# Patient Record
Sex: FEMALE | Race: BLACK OR AFRICAN AMERICAN | NOT HISPANIC OR LATINO | Employment: FULL TIME | URBAN - METROPOLITAN AREA
[De-identification: names, ages, dates, MRNs, and addresses within clinical notes are randomized per-mention and may not be internally consistent; named-entity substitution may affect disease eponyms.]

---

## 2023-06-15 ENCOUNTER — THERAPY VISIT (OUTPATIENT)
Dept: PHYSICAL THERAPY | Facility: CLINIC | Age: 43
End: 2023-06-15
Payer: COMMERCIAL

## 2023-06-15 DIAGNOSIS — R10.2 PELVIC PAIN IN FEMALE: Primary | ICD-10-CM

## 2023-06-15 PROCEDURE — 97161 PT EVAL LOW COMPLEX 20 MIN: CPT | Mod: GP | Performed by: PHYSICAL THERAPIST

## 2023-06-15 PROCEDURE — 97110 THERAPEUTIC EXERCISES: CPT | Mod: GP | Performed by: PHYSICAL THERAPIST

## 2023-06-15 NOTE — PROGRESS NOTES
PHYSICAL THERAPY EVALUATION  Type of Visit: Evaluation    See electronic medical record for Abuse and Falls Screening details.    Subjective      Presenting condition or subjective complaint: Pelvic Pain  Date of onset: 06/15/22    Relevant medical history: -- (fibromyalgia)   Dates & types of surgery: none    Prior diagnostic imaging/testing results:       Prior therapy history for the same diagnosis, illness or injury: Yes [id number] however at that time was also having urgency issues with urination, not having that problem anymore      Living Environment  Social support: With family members   Type of home: House   Stairs to enter the home: Yes       Ramp: No   Stairs inside the home: Yes       Help at home: Home management tasks (cooking, cleaning)  Equipment owned:       Employment: Yes Computer job, IT  Hobbies/Interests: swimming    Patient goals for therapy: have intercourse with  in any position, sit    Pain assessment:      Objective      PELVIC EVALUATION  ADDITIONAL HISTORY:  Sex assigned at birth: Female  Gender identity: Female    Pronouns: She/Her Hers      Bladder History:  Feels bladder filling: Yes  Triggers for feeling of inability to wait to go to the bathroom: No    How long can you wait to urinate:    Gets up at night to urinate: No    Can stop the flow of urine when urinating: Yes  Volume of urine usually released: Average   Other issues:    Number of bladder infections in last 12 months:  0  Fluid intake per day: 1 glass of water      Medications taken for bladder: No     Activities causing urine leak:      Amount of urine typically leaked:    Pads used to help with leaking:          Bowel History:  Frequency of bowel movement:    Consistency of stool:      Ignores the urge to defecate: No  Other bowel issues:    Length of time spent trying to have a bowel movement:      Sexual Function History:  Sexual orientation:      Sexually active: Yes  Lubrication used:      Pelvic pain: Sitting     Pain or difficulty with orgasms/erection/ejaculation:    At end of evaluation, pt shared with PT that she can only have sex in 1 position due to pelvic pain, would like to be able to have sex in a variety of positions.  State of menopause:    Hormone medications:        Are you currently pregnant: No, Number of previous pregnancies: 2, Number of deliveries: 2, Have you been diagnosed with pelvic prolapse or abdominal separation: No, Do you get regular exercise: Yes, I do this type of exercise: Yes, swimming, Have you tried pelvic floor strengthening exercises for 4 weeks: No    Discussed reason for referral regarding pelvic health needs and external/internal pelvic floor muscle examination with patient/guardian.  Opportunity provided to ask questions and verbal consent for assessment and intervention was given.    PAIN: Pain Location: lumbar spine and radiating into anterior abdomen. pt reports pelvic floor feels heavy  POSTURE: Sitting Posture: Lordosis decreased  LUMBAR SCREEN: nil loss flexion, severe loss extension  HIP SCREEN: + RUFINA and FADIR B    PELVIC EXAM AND SI SCREEN:  Late for evaluation today, could not perform pelvic exam observation, could not perform internal exam or SI screen.        Assessment & Plan   CLINICAL IMPRESSIONS   Medical Diagnosis: Pelvic Pain    Treatment Diagnosis: Pelvic Pain   Impression/Assessment: Patient is a 43 year old female with Pelvic Pain and Lumbar pain complaints.  The following significant findings have been identified: Pain, Decreased ROM/flexibility, Decreased joint mobility and Impaired posture. These impairments interfere with their ability to perform self care tasks, work tasks, recreational activities, household chores, driving , household mobility and community mobility as compared to previous level of function.     Clinical Decision Making (Complexity):   Clinical Presentation: Evolving/Changing  Clinical Presentation Rationale: based on medical and personal  factors listed in PT evaluation  Clinical Decision Making (Complexity): Moderate complexity    PLAN OF CARE  Treatment Interventions:  Interventions: Manual Therapy, Neuromuscular Re-education, Therapeutic Activity, Therapeutic Exercise, Self-Care/Home Management    Long Term Goals            Frequency of Treatment: 1x per week  Duration of Treatment: 8 weeks    Recommended Referrals to Other Professionals: none  Education Assessment:        Risks and benefits of evaluation/treatment have been explained.   Patient/Family/caregiver agrees with Plan of Care.     Evaluation Time:     PT Avelino Low Complexity Minutes (99518): 25      Signing Clinician: Madai Paz PT

## 2023-06-16 ENCOUNTER — TRANSCRIBE ORDERS (OUTPATIENT)
Dept: OTHER | Age: 43
End: 2023-06-16

## 2023-06-16 DIAGNOSIS — R31.29 MICROSCOPIC HEMATURIA: Primary | ICD-10-CM
